# Patient Record
Sex: MALE | Employment: FULL TIME | URBAN - METROPOLITAN AREA
[De-identification: names, ages, dates, MRNs, and addresses within clinical notes are randomized per-mention and may not be internally consistent; named-entity substitution may affect disease eponyms.]

---

## 2017-04-04 DIAGNOSIS — R00.0 TACHYCARDIA: ICD-10-CM

## 2017-04-04 RX ORDER — ATENOLOL 50 MG/1
TABLET ORAL
Qty: 90 TAB | Refills: 1 | Status: SHIPPED | OUTPATIENT
Start: 2017-04-04 | End: 2017-09-22

## 2017-09-07 ENCOUNTER — TELEPHONE (OUTPATIENT)
Dept: INTERNAL MEDICINE CLINIC | Age: 42
End: 2017-09-07

## 2017-09-07 DIAGNOSIS — G62.9 NEUROPATHY: Primary | ICD-10-CM

## 2017-09-07 NOTE — TELEPHONE ENCOUNTER
Pt therapist called said that pt is working for L side but the R lower leg is not responding well. PT suggest that they pt has a referral to a neurologist to do testing for that R leg function. Please give physical therapist a call back.

## 2017-09-07 NOTE — TELEPHONE ENCOUNTER
Spoke with ishan patient therapist. Carlos Chauhan of referral to neurology.  Verbalized understanding

## 2017-09-11 RX ORDER — GABAPENTIN 800 MG/1
TABLET ORAL
Qty: 90 TAB | Refills: 0 | Status: SHIPPED | OUTPATIENT
Start: 2017-09-11 | End: 2017-11-14 | Stop reason: SDUPTHER

## 2017-09-22 ENCOUNTER — OFFICE VISIT (OUTPATIENT)
Dept: INTERNAL MEDICINE CLINIC | Age: 42
End: 2017-09-22

## 2017-09-22 VITALS
DIASTOLIC BLOOD PRESSURE: 86 MMHG | WEIGHT: 184.6 LBS | BODY MASS INDEX: 25 KG/M2 | TEMPERATURE: 97.8 F | OXYGEN SATURATION: 99 % | SYSTOLIC BLOOD PRESSURE: 116 MMHG | RESPIRATION RATE: 18 BRPM | HEIGHT: 72 IN | HEART RATE: 100 BPM

## 2017-09-22 DIAGNOSIS — J69.0: ICD-10-CM

## 2017-09-22 DIAGNOSIS — E10.11 TYPE 1 DIABETES MELLITUS WITH KETOACIDOTIC COMA (HCC): ICD-10-CM

## 2017-09-22 DIAGNOSIS — G43.001 MIGRAINE WITHOUT AURA AND WITH STATUS MIGRAINOSUS, NOT INTRACTABLE: ICD-10-CM

## 2017-09-22 DIAGNOSIS — Z09 HOSPITAL DISCHARGE FOLLOW-UP: Primary | ICD-10-CM

## 2017-09-22 DIAGNOSIS — E10.42 DM TYPE 1 WITH DIABETIC PERIPHERAL NEUROPATHY (HCC): ICD-10-CM

## 2017-09-22 DIAGNOSIS — J80 ARDS (ADULT RESPIRATORY DISTRESS SYNDROME) (HCC): ICD-10-CM

## 2017-09-22 RX ORDER — INSULIN DEGLUDEC 100 U/ML
INJECTION, SOLUTION SUBCUTANEOUS
COMMUNITY

## 2017-09-22 RX ORDER — DOXAZOSIN 8 MG/1
8 TABLET ORAL DAILY
COMMUNITY
End: 2018-04-27 | Stop reason: SDUPTHER

## 2017-09-22 RX ORDER — TOPIRAMATE 50 MG/1
TABLET, FILM COATED ORAL
COMMUNITY
Start: 2017-09-21 | End: 2018-04-27 | Stop reason: SDUPTHER

## 2017-09-22 RX ORDER — DULOXETIN HYDROCHLORIDE 30 MG/1
30 CAPSULE, DELAYED RELEASE ORAL DAILY
COMMUNITY
End: 2018-04-27 | Stop reason: SDUPTHER

## 2017-09-22 RX ORDER — AMITRIPTYLINE HYDROCHLORIDE 25 MG/1
75 TABLET, FILM COATED ORAL
Qty: 90 TAB | Refills: 5 | Status: SHIPPED | OUTPATIENT
Start: 2017-09-22

## 2017-09-22 NOTE — PROGRESS NOTES
1. Have you been to the ER, urgent care clinic since your last visit? Hospitalized since your last visit?no      2. Have you seen or consulted any other health care providers outside of the [address] [place] [address] since your last visit? Include any pap smears or colon screening.  No    Chief Complaint   Patient presents with   [facility] Follow Up     elavated blood sugar     Not fasting

## 2017-09-22 NOTE — PROGRESS NOTES
HISTORY OF PRESENT ILLNESS  Lexie Kraft is a 38 y.o. male. HPI  Here for hospital follow-up. He had sepsis, ARDS and aspiration PNA with DKA. He had been vomiting for a week when he passed out and hit his head. He was intubated and in the ICU 3 weeks and rehab for three weeks. He has foot drop and remains in PT. He has a new endocrinologist. He has daily migraines despite topamax. Past Medical History:   Diagnosis Date    Arthritis     ankle    DM (diabetes mellitus) type 1, uncontrolled, with ketoacidosis (HCC)     HTN (hypertension) 4/13/2016    Neuropathy in diabetes [facility]     Tobacco abuse      Current Outpatient Prescriptions   Medication Sig    insulin degludec (TRESIBA FLEXTOUCH U-100) 100 unit/mL (3 mL) inpn by SubCUTAneous route.  amitriptyline (ELAVIL) 25 mg tablet Take 3 Tabs by mouth nightly.  DULoxetine (CYMBALTA) 30 mg capsule Take 30 mg by mouth daily.  doxazosin (CARDURA) 8 mg tablet Take 8 mg by mouth daily.  gabapentin (NEURONTIN) 800 mg tablet TAKE 1 TABLET BY MOUTH 3 TIMES A DAY BEFORE MEALS    lisinopril (PRINIVIL, ZESTRIL) 5 mg tablet TAKE 1 TABLET BY MOUTH EVERY DAY    HUMALOG 100 unit/mL injection INJECT UP TO 70 UNITS DAILY VIA INSULIN PUMP    atenolol (TENORMIN) 50 mg tablet TAKE ONE TABLET BY MOUTH ONCE DAILY    topiramate (TOPAMAX) 50 mg tablet      No current facility-administered medications for this visit. Review of Systems   All other systems reviewed and are negative. Visit Vitals    /86 (BP 1 Location: Left arm, BP Patient Position: At rest)    Pulse 100    Temp 97.8 °F (36.6 °C) (Oral)    Resp 18    Ht 6' (1.829 m)    Wt 184 lb 9.6 oz (83.7 kg)    SpO2 99%    BMI 25.04 kg/m2       Physical Exam   Constitutional: He appears well-developed and well-nourished. Psychiatric: He has a normal mood and affect. His behavior is normal. Judgment and thought content normal.   Nursing note and vitals reviewed.       ASSESSMENT and PLAN  Diagnoses and all orders for this visit:    1. Hospital discharge follow-up    2. ARDS (adult respiratory distress syndrome) (HCC)  Resolved. 3. Aspiration pneumonia of left lower lobe due to milk ([facility]    4. Type 1 diabetes mellitus with ketoacidotic coma ([facility]  Per endocrine. 5. DM type 1 with diabetic peripheral neuropathy ([facility]    6. Migraine without aura and with status migrainosus, not intractable  -    restart amitriptyline (ELAVIL) 25 mg tablet; Take 3 Tabs by mouth nightly.

## 2017-09-22 NOTE — MR AVS SNAPSHOT
Visit Information Date & Time Provider Department Dept. Phone Encounter #  
 9/22/2017 11:00 AM Girtha Severe, MD [facility] [phone] [id number] Upcoming Health Maintenance Date Due DTaP/Tdap/Td series (1 - Tdap) 5/13/1998 EYE EXAM RETINAL OR DILATED Q1 2/13/2014 MICROALBUMIN Q1 8/20/2014 LIPID PANEL Q1 11/1/2014 FOOT EXAM Q1 10/12/2016 HEMOGLOBIN A1C Q6M 10/13/2016 INFLUENZA AGE 9 TO ADULT 8/1/2017 Allergies as of 9/22/2017  Review Complete On: 9/22/2017 By: Loida Steve Severity Noted Reaction Type Reactions Chocolate Flavor  01/30/2012   Intolerance Hives Current Immunizations  Reviewed on 7/15/2015 Name Date Influenza Vaccine 9/29/2015, 10/6/2013 Influenza Vaccine Whole 10/31/2012 ZZZ-RETIRED (DO NOT USE) Pneumococcal Vaccine (Unspecified Type) 10/30/2012 Not reviewed this visit You Were Diagnosed With   
  
 Codes Comments Hospital discharge follow-up    -  Primary ICD-10-CM: [facility] ICD-9-CM: V67.59   
 ARDS (adult respiratory distress syndrome) ([facility]     ICD-10-CM: J19 
ICD-9-CM: 518.52 Aspiration pneumonia of left lower lobe due to milk [facility])     ICD-10-CM: J69.0 ICD-9-CM: 507.0 Type 1 diabetes mellitus with ketoacidotic coma ([facility]     ICD-10-CM: E10.11 ICD-9-CM: 250.33 DM type 1 with diabetic peripheral neuropathy (HCC)     ICD-10-CM: E10.42 
ICD-9-CM: 250.61, 357.2 Migraine without aura and with status migrainosus, not intractable     ICD-10-CM: G43.001 ICD-9-CM: 346.12 Vitals BP Pulse Temp Resp Height(growth percentile) Weight(growth percentile) 116/86 (BP 1 Location: Left arm, BP Patient Position: At rest) 100 97.8 °F (36.6 °C) (Oral) 18 6' (1.829 m) 184 lb 9.6 oz (83.7 kg) SpO2 BMI Smoking Status 99% 25.04 kg/m2 Current Every Day Smoker BMI and BSA Data Body Mass Index Body Surface Area  25.04 kg/m 2 2.06 m 2  
  
  
 Preferred Pharmacy Pharmacy Name Phone [facility]/PHARMACY #[id number]- [place], [address] [phone] Your Updated Medication List  
  
   
This list is accurate as of: 9/22/17 11:31 AM.  Always use your most recent med list.  
  
  
  
  
 amitriptyline 25 mg tablet Commonly known as:  ELAVIL Take 3 Tabs by mouth nightly. atenolol 50 mg tablet Commonly known as:  TENORMIN  
TAKE ONE TABLET BY MOUTH ONCE DAILY  
  
 gabapentin 800 mg tablet Commonly known as:  NEURONTIN  
TAKE 1 TABLET BY MOUTH 3 TIMES A DAY BEFORE MEALS HumaLOG 100 unit/mL injection Generic drug:  insulin lispro INJECT UP TO 70 UNITS DAILY VIA INSULIN PUMP  
  
 lisinopril 5 mg tablet Commonly known as:  PRINIVIL, ZESTRIL  
TAKE 1 TABLET BY MOUTH EVERY DAY  
  
 topiramate 50 mg tablet Commonly known as:  TOPAMAX  
  
 TRESIBA FLEXTOUCH U-100 100 unit/mL (3 mL) Inpn Generic drug:  insulin degludec  
by SubCUTAneous route. Prescriptions Sent to Pharmacy Refills  
 amitriptyline (ELAVIL) 25 mg tablet 5 Sig: Take 3 Tabs by mouth nightly. Class: Normal  
 Pharmacy: [facility]/pharmacy #[id number]- [place], [address] Ph #: [phone] Route: Oral  
  
Introducing [facility]! Wally Henao introduces [employer] patient portal. Now you can access parts of your medical record, email your doctor's office, and request medication refills online. 1. In your internet browser, go to [email or web address] - [email or web address] 2. Click on the First Time User? Click Here link in the Sign In box. You will see the New Member Sign Up page. 3. Enter your [employer] Access Code exactly as it appears below. You will not need to use this code after youve completed the sign-up process. If you do not sign up before the expiration date, you must request a new code.  
 
· [employer] Access Code: [email or web address] 
 Expires: 12/21/2017 11:31 AM 
 
4. Enter the last four digits of your Social Security Number (xxxx) and Date of Birth (mm/dd/yyyy) as indicated and click Submit. You will be taken to the next sign-up page. 5. Create a [employer] ID. This will be your [employer] login ID and cannot be changed, so think of one that is secure and easy to remember. 6. Create a [employer] password. You can change your password at any time. 7. Enter your Password Reset Question and Answer. This can be used at a later time if you forget your password. 8. Enter your e-mail address. You will receive e-mail notification when new information is available in [address] 9. Click Sign Up. You can now view and download portions of your medical record. 10. Click the Download Summary menu link to download a portable copy of your medical information. If you have questions, please visit the Frequently Asked Questions section of the [employer] website. Remember, [employer] is NOT to be used for urgent needs. For medical emergencies, dial 911. Now available from your iPhone and Android! Please provide this summary of care documentation to your next provider. Your primary care clinician is listed as [phone] [address] [place] [address] [address]. If you have any questions after today's visit, please call [phone].

## 2017-11-13 RX ORDER — INSULIN LISPRO 100 [IU]/ML
INJECTION, SOLUTION INTRAVENOUS; SUBCUTANEOUS
Qty: 20 ML | Refills: 5 | Status: SHIPPED | OUTPATIENT
Start: 2017-11-13 | End: 2018-03-14 | Stop reason: SDUPTHER

## 2017-11-20 DIAGNOSIS — R00.0 TACHYCARDIA: ICD-10-CM

## 2017-11-20 RX ORDER — ATENOLOL 50 MG/1
TABLET ORAL
Qty: 90 TAB | Refills: 1 | Status: SHIPPED | OUTPATIENT
Start: 2017-11-20 | End: 2018-04-27 | Stop reason: SDUPTHER

## 2018-03-14 RX ORDER — INSULIN LISPRO 100 [IU]/ML
INJECTION, SOLUTION INTRAVENOUS; SUBCUTANEOUS
Qty: 20 ML | Refills: 5
Start: 2018-03-14 | End: 2018-05-21 | Stop reason: SDUPTHER

## 2018-04-12 RX ORDER — LISINOPRIL 5 MG/1
TABLET ORAL
Qty: 30 TAB | Refills: 5 | Status: SHIPPED | OUTPATIENT
Start: 2018-04-12 | End: 2018-04-27 | Stop reason: SDUPTHER

## 2018-04-13 RX ORDER — LISINOPRIL 5 MG/1
TABLET ORAL
Qty: 30 TAB | Refills: 0 | Status: SHIPPED | OUTPATIENT
Start: 2018-04-13 | End: 2018-05-15 | Stop reason: SDUPTHER

## 2018-04-27 ENCOUNTER — OFFICE VISIT (OUTPATIENT)
Dept: INTERNAL MEDICINE CLINIC | Age: 43
End: 2018-04-27

## 2018-04-27 VITALS
WEIGHT: 183 LBS | RESPIRATION RATE: 16 BRPM | TEMPERATURE: 98.9 F | BODY MASS INDEX: 24.79 KG/M2 | HEART RATE: 120 BPM | OXYGEN SATURATION: 97 % | SYSTOLIC BLOOD PRESSURE: 142 MMHG | DIASTOLIC BLOOD PRESSURE: 92 MMHG | HEIGHT: 72 IN

## 2018-04-27 DIAGNOSIS — R00.0 TACHYCARDIA: ICD-10-CM

## 2018-04-27 DIAGNOSIS — I10 HYPERTENSION, UNSPECIFIED TYPE: ICD-10-CM

## 2018-04-27 DIAGNOSIS — E10.42 DIABETIC POLYNEUROPATHY ASSOCIATED WITH TYPE 1 DIABETES MELLITUS (HCC): Chronic | ICD-10-CM

## 2018-04-27 DIAGNOSIS — E10.42 TYPE 1 DIABETES MELLITUS WITH DIABETIC POLYNEUROPATHY (HCC): Primary | Chronic | ICD-10-CM

## 2018-04-27 RX ORDER — LISINOPRIL 5 MG/1
TABLET ORAL
Qty: 90 TAB | Refills: 1 | Status: SHIPPED | OUTPATIENT
Start: 2018-04-27

## 2018-04-27 RX ORDER — TOPIRAMATE 50 MG/1
50 TABLET, FILM COATED ORAL 2 TIMES DAILY
Qty: 180 TAB | Refills: 1 | Status: SHIPPED | OUTPATIENT
Start: 2018-04-27

## 2018-04-27 RX ORDER — ATENOLOL 50 MG/1
TABLET ORAL
Qty: 90 TAB | Refills: 1 | Status: SHIPPED | OUTPATIENT
Start: 2018-04-27 | End: 2018-05-15 | Stop reason: SDUPTHER

## 2018-04-27 RX ORDER — INSULIN DEGLUDEC 100 U/ML
INJECTION, SOLUTION SUBCUTANEOUS
Status: CANCELLED | OUTPATIENT
Start: 2018-04-27

## 2018-04-27 RX ORDER — FUROSEMIDE 20 MG/1
20 TABLET ORAL DAILY
Qty: 90 TAB | Refills: 1 | Status: SHIPPED | OUTPATIENT
Start: 2018-04-27 | End: 2018-10-13 | Stop reason: SDUPTHER

## 2018-04-27 RX ORDER — DOXAZOSIN 8 MG/1
8 TABLET ORAL DAILY
Qty: 90 TAB | Refills: 3 | Status: SHIPPED | OUTPATIENT
Start: 2018-04-27

## 2018-04-27 RX ORDER — INSULIN LISPRO 100 [IU]/ML
INJECTION, SOLUTION INTRAVENOUS; SUBCUTANEOUS
Qty: 20 ML | Refills: 5 | Status: CANCELLED
Start: 2018-04-27

## 2018-04-27 RX ORDER — DULOXETIN HYDROCHLORIDE 30 MG/1
30 CAPSULE, DELAYED RELEASE ORAL DAILY
Qty: 90 CAP | Refills: 1 | Status: SHIPPED | OUTPATIENT
Start: 2018-04-27

## 2018-04-27 RX ORDER — ATENOLOL 50 MG/1
TABLET ORAL
Qty: 90 TAB | Refills: 1 | Status: CANCELLED | OUTPATIENT
Start: 2018-04-27

## 2018-04-27 RX ORDER — GABAPENTIN 800 MG/1
TABLET ORAL
Qty: 270 TAB | Refills: 1 | Status: SHIPPED | OUTPATIENT
Start: 2018-04-27 | End: 2018-09-23 | Stop reason: SDUPTHER

## 2018-04-27 NOTE — PROGRESS NOTES
1. Have you been to the ER, urgent care clinic since your last visit? Hospitalized since your last visit? No    2. Have you seen or consulted any other health care providers outside of the [address] [place] [address] since your last visit? Include any pap smears or colon screening. Yes    Chief Complaint   Patient presents with    Medication Refill     Not fasting      Chief Complaint   Patient presents with    Medication Refill     He is a 38 y.o. male who presents for evalution. Reviewed PmHx, RxHx, FmHx, SocHx, AllgHx and updated and dated in the chart. Patient Active Problem List    Diagnosis    Type 1 diabetes mellitus with diabetic polyneuropathy ([facility]    Hypertension    Tobacco abuse    HTN (hypertension)    Hyponatremia    Neuropathy in diabetes ([facility]    DJD (degenerative joint disease), ankle and foot       Review of Systems - negative except as listed above in the HPI    Objective:     Vitals:    04/27/18 1427   BP: (!) 142/92   Pulse: (!) 120   Resp: 16   Temp: 98.9 °F (37.2 °C)   TempSrc: Oral   SpO2: 97%   Weight: 183 lb (83 kg)   Height: 6' (1.829 m)     Physical Examination: General appearance - alert, well appearing, and in no distress  Chest - clear to auscultation, no wheezes, rales or rhonchi, symmetric air entry  Heart - normal rate, regular rhythm, normal S1, S2, no murmurs, rubs, clicks or gallops      Assessment/ Plan:   Diagnoses and all orders for this visit:    1. Type 1 diabetes mellitus with diabetic polyneuropathy ([facility]  2. Diabetic polyneuropathy associated with type 1 diabetes mellitus ([facility]  -seeing endo and labs done there  -asked pt to get refills for insulin from endo    3. Tachycardia  -     atenolol (TENORMIN) 50 mg tablet; TAKE ONE TABLET BY MOUTH ONCE DAILY    4.  Hypertension, unspecified type  -sl inc    Other orders  -     lisinopril (PRINIVIL, ZESTRIL) 5 mg tablet; TAKE 1 TABLET BY MOUTH EVERY DAY  -     gabapentin (NEURONTIN) 800 mg tablet; TAKE 1 TABLET BY MOUTH 3 TIMES A DAY BEFORE MEALS  -     topiramate (TOPAMAX) 50 mg tablet; Take 1 Tab by mouth two (2) times a day. -     DULoxetine (CYMBALTA) 30 mg capsule; Take 1 Cap by mouth daily. -     doxazosin (CARDURA) 8 mg tablet; Take 1 Tab by mouth daily. -     furosemide (LASIX) 20 mg tablet; Take 1 Tab by mouth daily. Indications: Edema       Follow-up Disposition:  Return if symptoms worsen or fail to improve. I have discussed the diagnosis with the patient and the intended plan as seen in the above orders. The patient understands and agrees with the plan. The patient has received an after-visit summary and questions were answered concerning future plans. Medication Side Effects and Warnings were discussed with patient  Patient Labs were reviewed and or requested:  Patient Past Records were reviewed and or requested    Cheli Brown M.D. There are no Patient Instructions on file for this visit.

## 2018-04-27 NOTE — MR AVS SNAPSHOT
[address] [place] [address] [address] [place] [address] 
[phone] Patient: Jason Mendes MRN: [id number] NYW:[id number] Visit Information Date & Time Provider Department Dept. Phone Encounter #  
 4/27/2018  2:30 PM Michael Frazier MD [facility] [phone] [id number] Follow-up Instructions Return if symptoms worsen or fail to improve. Upcoming Health Maintenance Date Due DTaP/Tdap/Td series (1 - Tdap) 5/13/1998 EYE EXAM RETINAL OR DILATED Q1 2/13/2014 MICROALBUMIN Q1 8/20/2014 LIPID PANEL Q1 11/1/2014 FOOT EXAM Q1 10/12/2016 HEMOGLOBIN A1C Q6M 10/13/2016 Influenza Age 5 to Adult 8/1/2018 Allergies as of 4/27/2018  Review Complete On: 4/27/2018 By: Michael Frazier MD  
  
 Severity Noted Reaction Type Reactions Chocolate Flavor  01/30/2012   Intolerance Hives Current Immunizations  Reviewed on 7/15/2015 Name Date Influenza Vaccine 9/29/2015, 10/6/2013 Influenza Vaccine Whole 10/31/2012 ZZZ-RETIRED (DO NOT USE) Pneumococcal Vaccine (Unspecified Type) 10/30/2012 Not reviewed this visit You Were Diagnosed With   
  
 Codes Comments Type 1 diabetes mellitus with diabetic polyneuropathy (HCC)    -  Primary ICD-10-CM: E10.42 
ICD-9-CM: 250.61, 357.2 Diabetic polyneuropathy associated with type 1 diabetes mellitus ([facility]     ICD-10-CM: E10.42 
ICD-9-CM: 250.61, 357.2 Tachycardia     ICD-10-CM: R00.0 ICD-9-CM: 785.0 Hypertension, unspecified type     ICD-10-CM: I10 
ICD-9-CM: 401.9 Vitals BP Pulse Temp Resp Height(growth percentile) Weight(growth percentile) (!) 142/92 (BP 1 Location: Left arm, BP Patient Position: At rest) (!) 120 98.9 °F (37.2 °C) (Oral) 16 6' (1.829 m) 183 lb (83 kg) SpO2 BMI Smoking Status 97% 24.82 kg/m2 Current Every Day Smoker BMI and BSA Data Body Mass Index Body Surface Area 24.82 kg/m 2 2.05 m 2 Preferred Pharmacy Pharmacy Name Phone [facility]/PHARMACY [id number] [phone] Your Updated Medication List  
  
   
This list is accurate as of 4/27/18  2:43 PM.  Always use your most recent med list.  
  
  
  
  
 amitriptyline 25 mg tablet Commonly known as:  ELAVIL Take 3 Tabs by mouth nightly. * atenolol 50 mg tablet Commonly known as:  TENORMIN  
TAKE ONE TABLET BY MOUTH ONCE DAILY  
  
 * atenolol 50 mg tablet Commonly known as:  TENORMIN  
TAKE ONE TABLET BY MOUTH ONCE DAILY doxazosin 8 mg tablet Commonly known as:  CARDURA Take 1 Tab by mouth daily. DULoxetine 30 mg capsule Commonly known as:  CYMBALTA Take 1 Cap by mouth daily. furosemide 20 mg tablet Commonly known as:  LASIX Take 1 Tab by mouth daily. Indications: Edema  
  
 gabapentin 800 mg tablet Commonly known as:  NEURONTIN  
TAKE 1 TABLET BY MOUTH 3 TIMES A DAY BEFORE MEALS  
  
 insulin lispro 100 unit/mL injection Commonly known as:  HumaLOG U-100 Insulin INJECT UP TO 70 UNITS DAILY VIA INSULIN PUMP  
  
 * lisinopril 5 mg tablet Commonly known as:  PRINIVIL, ZESTRIL  
TAKE 1 TABLET BY MOUTH EVERY DAY  
  
 * lisinopril 5 mg tablet Commonly known as:  PRINIVIL, ZESTRIL  
TAKE 1 TABLET BY MOUTH EVERY DAY  
  
 topiramate 50 mg tablet Commonly known as:  TOPAMAX Take 1 Tab by mouth two (2) times a day. TRESIBA FLEXTOUCH U-100 100 unit/mL (3 mL) Inpn Generic drug:  insulin degludec  
by SubCUTAneous route. * Notice: This list has 4 medication(s) that are the same as other medications prescribed for you. Read the directions carefully, and ask your doctor or other care provider to review them with you. Prescriptions Sent to Pharmacy Refills  
 lisinopril (PRINIVIL, ZESTRIL) 5 mg tablet 1 Sig: TAKE 1 TABLET BY MOUTH EVERY DAY  Class: Normal  
 [id number] [address] Ph #: [phone]  
 gabapentin (NEURONTIN) 800 mg tablet 1 Sig: TAKE 1 TABLET BY MOUTH 3 TIMES A DAY BEFORE MEALS Class: Normal  
 [id number] Ph #: [phone]  
 topiramate (TOPAMAX) 50 mg tablet 1 Sig: Take 1 Tab by mouth two (2) times a day. Class: Normal  
 Pharmacy: [facility]/pharmacy #[id number] Ph #: [phone] Route: Oral  
 DULoxetine (CYMBALTA) 30 mg capsule 1 Sig: Take 1 Cap by mouth daily. Class: Normal  
 [id number] [address] Ph #: [phone] Route: Oral  
 doxazosin (CARDURA) 8 mg tablet 3 Sig: Take 1 Tab by mouth daily. Class: Normal  
 [id number] [address] Ph #: [phone] Route: Oral  
 atenolol (TENORMIN) 50 mg tablet 1 Sig: TAKE ONE TABLET BY MOUTH ONCE DAILY Class: Normal  
 [id number] Ph #: [phone]  
 furosemide (LASIX) 20 mg tablet 1 Sig: Take 1 Tab by mouth daily. Indications: Edema Class: Normal  
 [id number] [address] Ph #: [phone] Route: Oral  
  
Follow-up Instructions Return if symptoms worsen or fail to improve. Introducing [facility]! [facility] introduces [employer] patient portal. Now you can access parts of your medical record, email your doctor's office, and request medication refills online. 1. In your internet browser, go to [email or web address] 2. Click on the First Time User? Click Here link in the Sign In box. You will see the New Member Sign Up page. 3. Enter your [employer] Access Code exactly as it appears below. You will not need to use this code after youve completed the sign-up process. If you do not sign up before the expiration date, you must request a new code. · [employer] Access Code: [email or web address] Expires: 7/26/2018  2:43 PM 
 
4. Enter the last four digits of your Social Security Number (xxxx) and Date of Birth (mm/dd/yyyy) as indicated and click Submit. You will be taken to the next sign-up page. 5. Create a [employer] ID. This will be your [employer] login ID and cannot be changed, so think of one that is secure and easy to remember. 6. Create a [employer] password. You can change your password at any time. 7. Enter your Password Reset Question and Answer. This can be used at a later time if you forget your password. 8. Enter your e-mail address. You will receive e-mail notification when new information is available in [address] 9. Click Sign Up. You can now view and download portions of your medical record. 10. Click the Download Summary menu link to download a portable copy of your medical information. If you have questions, please visit the Frequently Asked Questions section of the [employer] website. Remember, [employer] is NOT to be used for urgent needs. For medical emergencies, dial 911. Now available from your iPhone and Android! Please provide this summary of care documentation to your next provider. Your primary care clinician is listed as [phone] [address] [place] [address] [address]. If you have any questions after today's visit, please call [phone].

## 2018-05-15 DIAGNOSIS — R00.0 TACHYCARDIA: ICD-10-CM

## 2018-05-15 RX ORDER — LISINOPRIL 5 MG/1
TABLET ORAL
Qty: 30 TAB | Refills: 0 | Status: SHIPPED | OUTPATIENT
Start: 2018-05-15

## 2018-05-15 RX ORDER — ATENOLOL 50 MG/1
TABLET ORAL
Qty: 90 TAB | Refills: 1 | Status: SHIPPED | OUTPATIENT
Start: 2018-05-15

## 2018-05-21 RX ORDER — INSULIN LISPRO 100 [IU]/ML
INJECTION, SOLUTION INTRAVENOUS; SUBCUTANEOUS
Qty: 20 ML | Refills: 5
Start: 2018-05-21

## 2018-09-24 RX ORDER — GABAPENTIN 800 MG/1
TABLET ORAL
Qty: 270 TAB | Refills: 0 | Status: SHIPPED | OUTPATIENT
Start: 2018-09-24 | End: 2018-12-19 | Stop reason: SDUPTHER

## 2018-10-15 DIAGNOSIS — R00.0 TACHYCARDIA: ICD-10-CM

## 2018-10-15 RX ORDER — FUROSEMIDE 20 MG/1
TABLET ORAL
Qty: 90 TAB | Refills: 1 | Status: SHIPPED | OUTPATIENT
Start: 2018-10-15 | End: 2019-05-16 | Stop reason: SDUPTHER

## 2018-10-15 RX ORDER — ATENOLOL 50 MG/1
TABLET ORAL
Qty: 90 TAB | Refills: 1 | Status: SHIPPED | OUTPATIENT
Start: 2018-10-15

## 2018-12-19 RX ORDER — GABAPENTIN 800 MG/1
TABLET ORAL
Qty: 270 TAB | Refills: 0 | Status: SHIPPED | OUTPATIENT
Start: 2018-12-19 | End: 2019-03-18 | Stop reason: SDUPTHER

## 2019-03-18 RX ORDER — GABAPENTIN 800 MG/1
TABLET ORAL
Qty: 270 TAB | Refills: 0 | Status: SHIPPED | OUTPATIENT
Start: 2019-03-18

## 2019-05-16 RX ORDER — FUROSEMIDE 20 MG/1
TABLET ORAL
Qty: 90 TAB | Refills: 1 | Status: SHIPPED | OUTPATIENT
Start: 2019-05-16

## 2022-03-19 PROBLEM — E10.42 TYPE 1 DIABETES MELLITUS WITH DIABETIC POLYNEUROPATHY (HCC): Status: ACTIVE | Noted: 2018-04-27

## 2022-03-20 PROBLEM — I10 HYPERTENSION: Status: ACTIVE | Noted: 2018-04-27

## 2025-05-04 ENCOUNTER — HOSPITAL ENCOUNTER (EMERGENCY)
Facility: HOSPITAL | Age: 50
Discharge: HOME | End: 2025-05-04
Attending: EMERGENCY MEDICINE
Payer: COMMERCIAL

## 2025-05-04 ENCOUNTER — APPOINTMENT (OUTPATIENT)
Facility: HOSPITAL | Age: 50
End: 2025-05-04
Payer: COMMERCIAL

## 2025-05-04 VITALS
WEIGHT: 108 LBS | OXYGEN SATURATION: 100 % | HEART RATE: 87 BPM | HEIGHT: 72 IN | TEMPERATURE: 97.8 F | DIASTOLIC BLOOD PRESSURE: 102 MMHG | RESPIRATION RATE: 16 BRPM | BODY MASS INDEX: 14.63 KG/M2 | SYSTOLIC BLOOD PRESSURE: 167 MMHG

## 2025-05-04 DIAGNOSIS — S62.349A CLOSED NONDISPLACED FRACTURE OF BASE OF METACARPAL BONE, UNSPECIFIED FRACTURE MORPHOLOGY, UNSPECIFIED METACARPAL, INITIAL ENCOUNTER: ICD-10-CM

## 2025-05-04 DIAGNOSIS — S62.91XA FRACTURED HAND, RIGHT, CLOSED, INITIAL ENCOUNTER: Primary | ICD-10-CM

## 2025-05-04 PROCEDURE — 99284 EMERGENCY DEPT VISIT MOD MDM: CPT

## 2025-05-04 PROCEDURE — 73130 X-RAY EXAM OF HAND: CPT

## 2025-05-04 PROCEDURE — 96372 THER/PROPH/DIAG INJ SC/IM: CPT

## 2025-05-04 PROCEDURE — 6360000002 HC RX W HCPCS: Performed by: PHYSICIAN ASSISTANT

## 2025-05-04 PROCEDURE — 29125 APPL SHORT ARM SPLINT STATIC: CPT

## 2025-05-04 RX ORDER — KETOROLAC TROMETHAMINE 30 MG/ML
30 INJECTION, SOLUTION INTRAMUSCULAR; INTRAVENOUS
Status: COMPLETED | OUTPATIENT
Start: 2025-05-04 | End: 2025-05-04

## 2025-05-04 RX ORDER — IBUPROFEN 800 MG/1
800 TABLET, FILM COATED ORAL EVERY 8 HOURS PRN
Qty: 30 TABLET | Refills: 1 | Status: SHIPPED | OUTPATIENT
Start: 2025-05-04

## 2025-05-04 RX ORDER — HYDROCODONE BITARTRATE AND ACETAMINOPHEN 5; 325 MG/1; MG/1
1-2 TABLET ORAL EVERY 8 HOURS PRN
Qty: 12 TABLET | Refills: 0 | Status: SHIPPED | OUTPATIENT
Start: 2025-05-04 | End: 2025-05-07

## 2025-05-04 RX ADMIN — KETOROLAC TROMETHAMINE 30 MG: 30 INJECTION, SOLUTION INTRAMUSCULAR at 14:03

## 2025-05-04 ASSESSMENT — PAIN DESCRIPTION - LOCATION: LOCATION: HAND

## 2025-05-04 ASSESSMENT — PAIN SCALES - GENERAL
PAINLEVEL_OUTOF10: 7
PAINLEVEL_OUTOF10: 9
PAINLEVEL_OUTOF10: 9

## 2025-05-04 ASSESSMENT — PAIN DESCRIPTION - ORIENTATION: ORIENTATION: RIGHT

## 2025-05-04 ASSESSMENT — PAIN DESCRIPTION - DESCRIPTORS: DESCRIPTORS: ACHING

## 2025-05-04 ASSESSMENT — PAIN DESCRIPTION - FREQUENCY: FREQUENCY: CONTINUOUS

## 2025-05-04 ASSESSMENT — PAIN - FUNCTIONAL ASSESSMENT: PAIN_FUNCTIONAL_ASSESSMENT: 0-10

## 2025-05-04 NOTE — ED PROVIDER NOTES
EMERGENCY DEPARTMENT PHYSICIAN NOTE     Patient: Oscar Staton     Time of Service: 5/4/2025  1:23 PM     Chief complaint:   Chief Complaint   Patient presents with    Hand Injury        HISTORY:  Patient is a 49 y.o. male who presents to the emergency department with complaints of right hand injury.  Patient states yesterday afternoon he punched a metal cabinet.  Pain and swelling to the base of the right hand.  Full range of motion to all 5 fingers with no open wounds.  Patient states he took Advil early this morning with no improvement.  Denies prior injury to the area.  No additional complaints.      No past medical history on file.     No past surgical history on file.     No family history on file.     Social History     Socioeconomic History    Marital status: Single        Current Medications: Reviewed in chart.    Allergies: No Known Allergies       REVIEW OF SYSTEMS: See HPI for pertinent positives and negatives.      PHYSICAL EXAM:  BP (!) 169/96   Pulse 87   Temp 97.8 °F (36.6 °C)   Resp 16   Ht 1.829 m (6')   Wt 49 kg (108 lb)   SpO2 100%   BMI 14.65 kg/m²    Physical Exam  Vitals and nursing note reviewed.   Constitutional:       Appearance: He is not toxic-appearing.   HENT:      Head: Normocephalic and atraumatic.   Eyes:      Pupils: Pupils are equal, round, and reactive to light.   Cardiovascular:      Rate and Rhythm: Normal rate and regular rhythm.   Pulmonary:      Effort: Pulmonary effort is normal.   Abdominal:      General: Abdomen is flat. There is no distension.   Musculoskeletal:         General: Swelling, tenderness and signs of injury present.      Right hand: Swelling and tenderness present. No lacerations. Normal pulse.        Hands:       Cervical back: Normal range of motion.   Skin:     General: Skin is warm and dry.   Neurological:      Mental Status: He is alert and oriented to person, place, and time.   Psychiatric:         Mood and Affect: Mood normal.           ED

## 2025-05-04 NOTE — DISCHARGE INSTRUCTIONS
Rest, ice, elevate.  Leave splint in place until you follow-up with the hand doctor.    Call tomorrow a.m. for an a follow-up appointment.

## 2025-05-04 NOTE — ED TRIAGE NOTES
Patient states he got upset while moving yesterday and punched a metal file cabinet with his right hand, pts right hand is swollen and bruised with no open wounds.